# Patient Record
Sex: MALE | Race: BLACK OR AFRICAN AMERICAN
[De-identification: names, ages, dates, MRNs, and addresses within clinical notes are randomized per-mention and may not be internally consistent; named-entity substitution may affect disease eponyms.]

---

## 2017-11-29 NOTE — MRI
MRI OF THE LEFT SHOULDER WITHOUT CONTRAST

11/29/17

 

INDICATION:

Left shoulder pain for a few months with fall and loss of range of motion. 

 

FINDINGS:  

There is a complete tear of supraspinatus with retraction of the tendon to the level of the superior 
humeral head. No muscular atrophy is evident. There is mild tendinosis of the infraspinatus and subsc
apularis. Biceps tendon is located. There is mild tendinosis of the intra-articular biceps tendon. No
 definite paralabral cyst is evident. Glenohumeral articular surface is normal appearing. There is ty
pe II acromion. There is mild AC joint osteoarthrosis. No enlarged lymph nodes are evident. Motion ar
tifact slightly limits detail.

 

IMPRESSION:  

1.      Complete supraspinatus tendon disruption with retraction to the level of the superior humeral
 head.

2.      Mild AC joint osteoarthrosis.

3.      Mild tendinosis of the infraspinatus, supraspinatus and subscapularis.

4.      Mild biceps tendinosis. 

 

POS: [facility]

## 2018-02-14 NOTE — OP
DATE OF PROCEDURE:  02/14/2018

 

PREOPERATIVE DIAGNOSES:  Left shoulder impingement with rotator cuff tear and biceps tendon tearing a
nd instability.

 

POSTOPERATIVE DIAGNOSES:  Left shoulder impingement with rotator cuff tear and biceps tendon tearing 
and instability.

 

PROCEDURE PERFORMED:

1.  Left shoulder, closed manipulation.

2.  Left shoulder arthroscopy with subacromial decompression.

3.  Left shoulder arthroscopic rotator cuff repair.

4.  Open biceps tenodesis.

 

SURGEON:  Clem Wiggins M.D.

 

ASSISTANT:  Mannie Hernández PA-C.

 

BLOOD LOSS:  About 100 mL.

 

COMPLICATIONS:  None.

 

ANESTHESIA:  He had general anesthetic.  He also had a preoperative block.

 

IMPLANTS:  For the rotator cuff repair, we used an [employer] titanium triple-loaded rotator cuff anchor
 followed by one 4.75 mm [employer] SwiveLock for the double-row repair.  On the biceps, we used a 
7 x 23 BioComposite Bio-Tenodesis screw also made by [employer].

 

DISPOSITION:  Did go to the recovery room in stable condition.

 

INDICATIONS:  Mr. Ventura is a 57-year-old male, who comes in complaining of continued left shoulder
 pain and discomfort and weakness despite nonoperative treatment.  At this time, he opted to have jason
arturo.

 

DESCRIPTION OF PROCEDURE:  After all appropriate consent forms were explained and signed, he was take
n to the operating room and at this time was given general anesthetic.  Once the level of anesthesia 
was appropriate, he was rolled into the right lateral decubitus position with all bony prominences we
ll-padded.  Axillary roll was placed underneath the right axilla and the bean bag was inflated to hol
d him in this position.  The left shoulder was then taken through full range of motion and was found 
to be tight, requiring manipulation to obtain full forward flexion.  External rotation was not an iss
ue.  Once this was performed, the arm was then hung with 15 pounds of weight in standard arthroscopic
 fashion.  The left shoulder and upper extremity were then prepped and draped in the standard surgica
l fashion.  Bony anatomic landmarks were drawn out and subacromial space was infiltrated with lidocai
ne with epinephrine.  At this time, posterior portal was established and the scope was placed into th
e shoulder joint.  Anterior working portal was then made using a needle localization technique.  Ther
e was a copious amount of hemorrhage in the joint.  This was removed with a suction shaver device.  T
he articular cartilage of the humeral head and glenoid were in excellent condition.  Subscapularis wa
s intact.  No loose bodies were noted in the axillary pouch.  Full thickness cuff tear was noted.  Th
e biceps tendon was found to be nearly atretic in appearance and had some intratendinous tearing.  It
 was attached to degenerative superior labrum with degenerative tear and found to have a copious amou
nt of synovitis surrounding the biceps tendon, indicating chronic inflammation.  At this time, a gree
n cannula was placed through the anterior portal and an 18-gauge needle was placed through the biceps
 tendon to shuttle suture through this.  The arthroscopic scissors were used to cut the tendon off th
e superior labrum.  This area was then debrided and the camera was then transitioned to the subacromi
al space.  Lateral working portal was made, and in this portal, a passport cannula was applied.  The 
green cannula was repositioned into the subacromial space as well.  At this time, we did remove the b
ursa from off the underlying rotator cuff showing fully the extent of the rotator cuff tear.  The edg
es of the tear were freshened up with the shaver, and at this time, the surface energy and shaver wer
e used to perform a small decompression.  We then removed all soft tissue off of the greater tuberosi
ty in preparation for repair.  A triple-loaded titanium anchor was placed into the bone just off the 
articular cartilage and a scorpion device was used to show the sutures of this anchor through the rot
ator cuff in a mattress fashion.  Once this had been performed, all 6 times, the 3 sutures were tied.
  All 6 and the sutures were then taken through 1 SwiveLock and we punched and placed the SwiveLock o
n the lateral cortex of the humerus for a double-row repair.  All sutures were then cut.  The arm was
 then taken through good range of motion showing our intact rotator cuff repair.  Once this was done,
 the scope was removed and drained the shoulder.  We then made a longitudinal incision where a previo
usly placed suture was noted down the lateral aspect of the arm.  We cut down through skin only.  Bov
ie was used to coagulate any brisk venous bleeding.  Deltoid fascia was opened sharply and finger dis
section was used to dissect in line with the deltoid fibers to get to the underlying transverse humer
al ligament.  This was opened up in its entirety, exposing the biceps tendon.  This was then pulled o
ut of the wound and sutured, the intraarticular portion being removed.  At this time, we then placed 
our pin, reamed with a 7 mm reamer to a depth of 25 mm and placed a 7 x 23 Bio[employer]posite Bio-Tenodesis
 screw in standard fashion.  The sutures were tied over top of this, and at this time, the wound was 
thoroughly irrigated and dried.  The deltoid was allowed to close upon itself.  Running Vicryl was us
ed to close our deltoid fascia, 2-0 Vicryl and sutures were used to close skin.  Bulky sterile dressi
ng was then applied.  The patient was awakened and he was taken to the recovery room in stable condit
ion.  All counts were correct at the end of the case and he did receive preoperative IV antibiotics.

## 2019-08-12 ENCOUNTER — HOSPITAL ENCOUNTER (EMERGENCY)
Dept: HOSPITAL 92 - ERS | Age: 59
Discharge: HOME | End: 2019-08-12
Payer: SELF-PAY

## 2019-08-12 DIAGNOSIS — G43.909: Primary | ICD-10-CM

## 2019-08-12 DIAGNOSIS — Z79.899: ICD-10-CM

## 2019-08-12 PROCEDURE — 70450 CT HEAD/BRAIN W/O DYE: CPT

## 2019-08-12 NOTE — CT
CT BRAIN WITHOUT CONTRAST:

 

HISTORY: 

Headache and dizziness.  

 

FINDINGS: 

There are no previous exams for comparison.

 

No evidence of acute infarct, hemorrhage, midline shift, or abnormal extraaxial fluid collections is 
seen.  The ventricular size is normal and the basilar cisterns patent.  The bony calvarium is intact.
  There is mild mucosal disease in the paranasal sinuses.

 

IMPRESSION: 

No CT evidence of acute intracranial process.

 

POS: [facility]